# Patient Record
Sex: FEMALE | Race: WHITE
[De-identification: names, ages, dates, MRNs, and addresses within clinical notes are randomized per-mention and may not be internally consistent; named-entity substitution may affect disease eponyms.]

---

## 2017-01-02 ENCOUNTER — HOSPITAL ENCOUNTER (EMERGENCY)
Dept: HOSPITAL 45 - C.EDB | Age: 72
Discharge: HOME | End: 2017-01-02
Payer: COMMERCIAL

## 2017-01-02 VITALS
HEIGHT: 60.98 IN | HEIGHT: 60.98 IN | WEIGHT: 138.23 LBS | BODY MASS INDEX: 26.1 KG/M2 | WEIGHT: 138.23 LBS | BODY MASS INDEX: 26.1 KG/M2

## 2017-01-02 VITALS — DIASTOLIC BLOOD PRESSURE: 90 MMHG | SYSTOLIC BLOOD PRESSURE: 154 MMHG | HEART RATE: 85 BPM | OXYGEN SATURATION: 98 %

## 2017-01-02 VITALS — TEMPERATURE: 98.42 F

## 2017-01-02 DIAGNOSIS — S20.20XA: ICD-10-CM
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
## 2017-01-02 DIAGNOSIS — Z79.82: ICD-10-CM

## 2017-01-02 DIAGNOSIS — J18.9: Primary | ICD-10-CM

## 2017-01-02 DIAGNOSIS — Z79.899: ICD-10-CM

## 2017-01-02 DIAGNOSIS — G20: ICD-10-CM

## 2017-01-02 DIAGNOSIS — I25.2: ICD-10-CM

## 2017-01-02 LAB
ALP SERPL-CCNC: 105 U/L (ref 45–117)
ALT SERPL-CCNC: 10 U/L (ref 12–78)
ANION GAP SERPL CALC-SCNC: 10 MMOL/L (ref 3–11)
AST SERPL-CCNC: 18 U/L (ref 15–37)
BASOPHILS # BLD: 0.01 K/UL (ref 0–0.2)
BASOPHILS NFR BLD: 0.1 %
BUN SERPL-MCNC: 10 MG/DL (ref 7–18)
BUN/CREAT SERPL: 14.2 (ref 10–20)
CALCIUM SERPL-MCNC: 8.9 MG/DL (ref 8.5–10.1)
CHLORIDE SERPL-SCNC: 109 MMOL/L (ref 98–107)
CKMB/CK RATIO: 1.4 (ref 0–3)
CO2 SERPL-SCNC: 25 MMOL/L (ref 21–32)
COMPLETE: YES
CREAT CL PREDICTED SERPL C-G-VRATE: 60.8 ML/MIN
CREAT SERPL-MCNC: 0.73 MG/DL (ref 0.6–1.2)
EOSINOPHIL NFR BLD AUTO: 238 K/UL (ref 130–400)
GLUCOSE SERPL-MCNC: 132 MG/DL (ref 70–99)
HCT VFR BLD CALC: 46.3 % (ref 37–47)
IG%: 0.1 %
IMM GRANULOCYTES NFR BLD AUTO: 13.5 %
LYMPHOCYTES # BLD: 1.28 K/UL (ref 1.2–3.4)
MAGNESIUM SERPL-MCNC: 2.2 MG/DL (ref 1.8–2.4)
MCH RBC QN AUTO: 29.8 PG (ref 25–34)
MCHC RBC AUTO-ENTMCNC: 33 G/DL (ref 32–36)
MCV RBC AUTO: 90.1 FL (ref 80–100)
MONOCYTES NFR BLD: 7.2 %
NEUTROPHILS # BLD AUTO: 1.8 %
NEUTROPHILS NFR BLD AUTO: 77.3 %
PMV BLD AUTO: 11.6 FL (ref 7.4–10.4)
POTASSIUM SERPL-SCNC: 4 MMOL/L (ref 3.5–5.1)
RBC # BLD AUTO: 5.14 M/UL (ref 4.2–5.4)
SODIUM SERPL-SCNC: 144 MMOL/L (ref 136–145)
WBC # BLD AUTO: 9.49 K/UL (ref 4.8–10.8)
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
## 2017-03-26 ENCOUNTER — HOSPITAL ENCOUNTER (EMERGENCY)
Dept: HOSPITAL 45 - C.EDB | Age: 72
Discharge: HOME | End: 2017-03-26
Payer: COMMERCIAL

## 2017-03-26 VITALS — TEMPERATURE: 98.42 F

## 2017-03-26 VITALS — OXYGEN SATURATION: 97 % | DIASTOLIC BLOOD PRESSURE: 71 MMHG | HEART RATE: 61 BPM | SYSTOLIC BLOOD PRESSURE: 124 MMHG

## 2017-03-26 VITALS — HEIGHT: 60.98 IN

## 2017-03-26 DIAGNOSIS — G20: ICD-10-CM

## 2017-03-26 DIAGNOSIS — M25.552: ICD-10-CM

## 2017-03-26 DIAGNOSIS — Z98.61: ICD-10-CM

## 2017-03-26 DIAGNOSIS — G89.29: ICD-10-CM

## 2017-03-26 DIAGNOSIS — I25.10: ICD-10-CM

## 2017-03-26 DIAGNOSIS — Z79.82: ICD-10-CM

## 2017-03-26 DIAGNOSIS — M54.5: Primary | ICD-10-CM

## 2017-03-26 DIAGNOSIS — I25.2: ICD-10-CM

## 2017-03-26 DIAGNOSIS — Z82.49: ICD-10-CM

## 2017-03-26 DIAGNOSIS — M48.00: ICD-10-CM

## 2017-03-26 DIAGNOSIS — Z79.899: ICD-10-CM

## 2017-03-26 DIAGNOSIS — I51.9: ICD-10-CM

## 2017-03-26 DIAGNOSIS — Z84.1: ICD-10-CM

## 2017-03-26 DIAGNOSIS — Z80.9: ICD-10-CM

## 2017-03-26 LAB
ANION GAP SERPL CALC-SCNC: 8 MMOL/L (ref 3–11)
BASOPHILS # BLD: 0.02 K/UL (ref 0–0.2)
BASOPHILS NFR BLD: 0.2 %
BUN SERPL-MCNC: 12 MG/DL (ref 7–18)
BUN/CREAT SERPL: 17.8 (ref 10–20)
CALCIUM SERPL-MCNC: 8.7 MG/DL (ref 8.5–10.1)
CHLORIDE SERPL-SCNC: 106 MMOL/L (ref 98–107)
CO2 SERPL-SCNC: 28 MMOL/L (ref 21–32)
COMPLETE: YES
CREAT SERPL-MCNC: 0.65 MG/DL (ref 0.6–1.2)
CRP SERPL-MCNC: < 0.29 MG/DL (ref 0–0.29)
EOSINOPHIL NFR BLD AUTO: 251 K/UL (ref 130–400)
GLUCOSE SERPL-MCNC: 153 MG/DL (ref 70–99)
HCT VFR BLD CALC: 44 % (ref 37–47)
IG%: 0.4 %
IMM GRANULOCYTES NFR BLD AUTO: 13.7 %
LYMPHOCYTES # BLD: 1.12 K/UL (ref 1.2–3.4)
MCH RBC QN AUTO: 30.2 PG (ref 25–34)
MCHC RBC AUTO-ENTMCNC: 33.4 G/DL (ref 32–36)
MCV RBC AUTO: 90.5 FL (ref 80–100)
MONOCYTES NFR BLD: 8.2 %
NEUTROPHILS # BLD AUTO: 2.9 %
NEUTROPHILS NFR BLD AUTO: 74.6 %
PMV BLD AUTO: 11.5 FL (ref 7.4–10.4)
POTASSIUM SERPL-SCNC: 3.9 MMOL/L (ref 3.5–5.1)
RBC # BLD AUTO: 4.86 M/UL (ref 4.2–5.4)
SODIUM SERPL-SCNC: 142 MMOL/L (ref 136–145)
WBC # BLD AUTO: 8.2 K/UL (ref 4.8–10.8)

## 2017-03-26 NOTE — DIAGNOSTIC IMAGING REPORT
CT OF THE LUMBAR SPINE WITHOUT CONTRAST



CT DOSE: 1127.02 mGy.cm



CLINICAL HISTORY: Low back pain.    



TECHNIQUE: Axial images of the lumbar spine were obtained without IV contrast.

Sagittal and coronal reconstructions were viewed.



COMPARISON STUDY:  CT of the abdomen and pelvis January 1, 2013.



FINDINGS: For purposes of numbering on this exam, the L5-S1 disc space is

assigned to axial image 591 of 757. There is mild rightward curvature of the

lumbar spine. No acute lumbar spine fracture is identified. There is mild

anterolisthesis of L3 on L4 and L4 on L5. There is moderate to marked disc space

narrowing with endplate irregularity and vacuum disc phenomenon at the L4-L5 and

L5-S1 levels. Vacuum disc phenomenon is noted at several additional levels.

There is mild loss of height of several vertebral bodies which is likely

degenerative. These findings have progressed since CT of January 1, 2013. A 1.5

cm sclerotic lesion within the left aspect of the L3 vertebral body is unchanged

since CT of January 1, 2013. Therefore, this is benign. The central canal and

neural foramen are suboptimally assessed given CT technique. However, there is

suspected severe central canal stenosis at L4-L5 and L5-S1 due to a combination

of disc bulge, ligamentous hypertrophy and facet arthrosis. There is at least

moderate central canal stenosis at L3-L4. There is moderate to severe multilevel

neural foraminal stenosis.



IMPRESSION:  



1. No acute lumbar spine fracture or subluxation.



2. Severe multilevel degenerative disc disease and facet arthrosis of the lumbar

spine, most pronounced at L4-L5 and L5-S1. Associated endplate irregularity with

mild loss of height of several vertebral bodies is likely degenerative.



3. Suboptimal evaluation of the central canal and neural foramen given CT

technique however suspected severe central canal stenosis at L4-L5 and L5-S1

with at least moderate central canal stenosis at L3-L4. Severe multilevel neural

foraminal stenosis. 



4. Grade I anterolisthesis of L3 on L4 and L4 and L5.







Electronically signed by:  Cisco Ortiz M.D.

3/26/2017 7:18 PM



Dictated Date/Time:  3/26/2017 7:08 PM

## 2017-03-26 NOTE — DIAGNOSTIC IMAGING REPORT
LEFT HIP UNILATERAL 2 VIEWS



CLINICAL HISTORY: Left hip pain.    



COMPARISON: CT of the abdomen and pelvis January 1, 2013.



FINDINGS:  Alignment of left hip is anatomic. There is no acute fracture or

suspicious lesion. There is no evidence for avascular necrosis. There is mild

joint space narrowing and osteophytosis of the left hip.



IMPRESSION: 



1. No acute fracture or dislocation of the left hip.



2. Mild left hip arthritis.







Electronically signed by:  Cisco Ortiz M.D.

3/26/2017 7:19 PM



Dictated Date/Time:  3/26/2017 7:18 PM

## 2017-03-27 NOTE — EMERGENCY ROOM VISIT NOTE
History


Report prepared by Elaina:  Jason Lee


Under the Supervision of:  Dr. Kodak Hackett M.D.


First contact with patient:  18:03


Chief Complaint:  BACK PAIN


Stated Complaint:  PAIN IN BACK AND LEG (L)





History of Present Illness


The patient is a 72 year old female who presents to the Emergency Room with 

complaints of constant and severe left sided lumbar back pain that began one 

night prior to arrival. The patient's  states that she has had similar 

episodes off and on in the past, but never to this severity. She rates her 

current pain as a 10/10 in severity. It is worsened with movement. Her pain 

begins in her left lower back and radiates down into her left knee. Her  

states that she has not been doing well at home lately, and cannot put any 

weight on her left leg at all. The patient denies any recent falls or injuries 

that could have caused the pain. She denies any numbness or tingling in the 

leg. She has been prescribed 10 mg of Vicodin by her primary care physician, 

but recently has only been given 5 mg. She does have a history of Parkinson's. 

She denies LOC, headache, fevers, chills, diaphoresis, visual changes, chest 

pain, breathing difficulties, nausea, vomiting, abdominal pain, melena, 

hematochezia, urinary symptoms, lymphadenopathy, rash, or other complaints.





   Source of History:  patient, spouse/significant other


   Onset:  One night PTA


   Position:  back (lower, left )


   Symptom Intensity:  10/10 in severity


   Modifying Factors (Worsening):  movement


Note:


Left leg pain in to left knee





Review of Systems


See HPI for pertinent positives and negatives.  A total of ten systems were 

reviewed and were otherwise negative.





Past Medical & Surgical


Medical Problems:


(1) CAD S/P percutaneous coronary angioplasty


(2) CHF (congestive heart failure), NYHA class III


(3) Heart disease


(4) Parkinson disease


(5) Pneumonia


(6) Spinal stenosis


(7) STEMI (ST elevation myocardial infarction)


(8) STEMI (ST elevation myocardial infarction)


(9) Ventricular aneurysm








Family History





Cancer


Hypertension


Kidney disease


Kidney stones





Social History


Smoking Status:  Never Smoker


Alcohol Use:  none


Drug Use:  none


Marital Status:  


Housing Status:  lives with significant other


Occupation Status:  retired





Current/Historical Medications


Scheduled


Amantadine HCl (Amantadine HCl), 100 MG PO QAM


Aspirin (Aspirin Ec), 81 MG PO DAILY


Atorvastatin (Lipitor), 80 MG PO HS


Carbidopa/Levodopa (Sinemet 25MG/100MG), 1 TAB PO Q1H


Metoprolol Succinate (Metoprolol Succinate ER), 12.5 MG PO QAM


Pramipexole Dihydrochloride (Pramipexole Dihydrochlori), 1 MG PO TID


Ranitidine HCl (Ranitidine HCl), 150 MG PO BID





Scheduled PRN


Baclofen (Baclofen), 20 MG PO TID PRN for Pain


Hydrocodone/Acetaminophen 5MG/325MG (Norco 5MG/325MG), 1-2 TABS PO Q6H PRN for 

Pain


Lactulose (Chronulac), 60 ML PO BID PRN for Constipation





Allergies


Coded Allergies:  


     No Known Allergies (Unverified , 3/26/17)





Physical Exam


Vital Signs











  Date Time  Temp Pulse Resp B/P Pulse Ox O2 Delivery O2 Flow Rate FiO2


 


3/26/17 20:13  61 18 124/71 97 Room Air  


 


3/26/17 17:56 36.9 92 16 174/90 96 Room Air  











Physical Exam


GENERAL: Awake, alert, uncomfortable-appearing, in no distress


HENT: Normocephalic, atraumatic. Oropharynx unremarkable.


EYES: Normal conjunctiva. Sclera non-icteric.


NECK: Supple. No nuchal rigidity. FROM. No JVD.


RESPIRATORY: Clear to auscultation.


CARDIAC: Regular rate, normal rhythm. Extremities warm and well perfused. 

Pulses equal.


ABDOMEN: Soft, non-distended. No tenderness to palpation. No rebound or 

guarding. No masses.


RECTAL: Deferred.


MUSCULOSKELETAL: Chest examination reveals no tenderness. The back is 

symmetrical on inspection without obvious abnormality. There is mild left 

lumbar paraspinal tenderness to palpation.  There is no CVA tenderness to 

palpation. No joint edema. 


LOWER EXTREMITIES: Calves are equal size bilaterally and non-tender. No edema. 

There is mild bruising noted diffusely across the left leg.  Negative Homans 

sign.


NEURO: Normal sensorium. No sensory or motor deficits noted. 


SKIN: No rash or jaundice noted.





Medical Decision & Procedures


ER Provider


Diagnostic Interpretation:


X ray results as stated below per my interpretation and radiologist 

interpretation. Other radiology results as stated below per my review and 

radiologist interpretation





LEFT HIP UNILATERAL 2 VIEWS





CLINICAL HISTORY: Left hip pain.    





COMPARISON: CT of the abdomen and pelvis January 1, 2013.





FINDINGS:  Alignment of left hip is anatomic. There is no acute fracture or


suspicious lesion. There is no evidence for avascular necrosis. There is mild


joint space narrowing and osteophytosis of the left hip.





IMPRESSION: 





1. No acute fracture or dislocation of the left hip.





2. Mild left hip arthritis.











Electronically signed by:  Cisco Ortiz M.D.


3/26/2017 7:19 PM





Dictated Date/Time:  3/26/2017 7:18 PM





CT OF THE LUMBAR SPINE WITHOUT CONTRAST





CT DOSE: 1127.02 mGy.cm





CLINICAL HISTORY: Low back pain.    





TECHNIQUE: Axial images of the lumbar spine were obtained without IV contrast.


Sagittal and coronal reconstructions were viewed.





COMPARISON STUDY:  CT of the abdomen and pelvis January 1, 2013.





FINDINGS: For purposes of numbering on this exam, the L5-S1 disc space is


assigned to axial image 591 of 757. There is mild rightward curvature of the


lumbar spine. No acute lumbar spine fracture is identified. There is mild


anterolisthesis of L3 on L4 and L4 on L5. There is moderate to marked disc space


narrowing with endplate irregularity and vacuum disc phenomenon at the L4-L5 and


L5-S1 levels. Vacuum disc phenomenon is noted at several additional levels.


There is mild loss of height of several vertebral bodies which is likely


degenerative. These findings have progressed since CT of January 1, 2013. A 1.5


cm sclerotic lesion within the left aspect of the L3 vertebral body is unchanged


since CT of January 1, 2013. Therefore, this is benign. The central canal and


neural foramen are suboptimally assessed given CT technique. However, there is


suspected severe central canal stenosis at L4-L5 and L5-S1 due to a combination


of disc bulge, ligamentous hypertrophy and facet arthrosis. There is at least


moderate central canal stenosis at L3-L4. There is moderate to severe multilevel


neural foraminal stenosis.





IMPRESSION:  





1. No acute lumbar spine fracture or subluxation.





2. Severe multilevel degenerative disc disease and facet arthrosis of the lumbar


spine, most pronounced at L4-L5 and L5-S1. Associated endplate irregularity with


mild loss of height of several vertebral bodies is likely degenerative.





3. Suboptimal evaluation of the central canal and neural foramen given CT


technique however suspected severe central canal stenosis at L4-L5 and L5-S1


with at least moderate central canal stenosis at L3-L4. Severe multilevel neural


foraminal stenosis. 





4. Grade I anterolisthesis of L3 on L4 and L4 and L5.











Electronically signed by:  Cisco Ortiz M.D.


3/26/2017 7:18 PM





Dictated Date/Time:  3/26/2017 7:08 PM





Laboratory Results


3/26/17 18:30








Red Blood Count 4.86, Mean Corpuscular Volume 90.5, Mean Corpuscular Hemoglobin 

30.2, Mean Corpuscular Hemoglobin Concent 33.4, Mean Platelet Volume 11.5, 

Neutrophils (%) (Auto) 74.6, Lymphocytes (%) (Auto) 13.7, Monocytes (%) (Auto) 

8.2, Eosinophils (%) (Auto) 2.9, Basophils (%) (Auto) 0.2, Neutrophils # (Auto) 

6.12, Lymphocytes # (Auto) 1.12, Monocytes # (Auto) 0.67, Eosinophils # (Auto) 

0.24, Basophils # (Auto) 0.02





3/26/17 18:30

















Test


  3/26/17


18:30


 


White Blood Count


  8.20 K/uL


(4.8-10.8)


 


Red Blood Count


  4.86 M/uL


(4.2-5.4)


 


Hemoglobin


  14.7 g/dL


(12.0-16.0)


 


Hematocrit 44.0 % (37-47) 


 


Mean Corpuscular Volume


  90.5 fL


()


 


Mean Corpuscular Hemoglobin


  30.2 pg


(25-34)


 


Mean Corpuscular Hemoglobin


Concent 33.4 g/dl


(32-36)


 


Platelet Count


  251 K/uL


(130-400)


 


Mean Platelet Volume


  11.5 fL


(7.4-10.4)


 


Neutrophils (%) (Auto) 74.6 % 


 


Lymphocytes (%) (Auto) 13.7 % 


 


Monocytes (%) (Auto) 8.2 % 


 


Eosinophils (%) (Auto) 2.9 % 


 


Basophils (%) (Auto) 0.2 % 


 


Neutrophils # (Auto)


  6.12 K/uL


(1.4-6.5)


 


Lymphocytes # (Auto)


  1.12 K/uL


(1.2-3.4)


 


Monocytes # (Auto)


  0.67 K/uL


(0.11-0.59)


 


Eosinophils # (Auto)


  0.24 K/uL


(0-0.5)


 


Basophils # (Auto)


  0.02 K/uL


(0-0.2)


 


RDW Standard Deviation


  44.9 fL


(36.4-46.3)


 


RDW Coefficient of Variation


  13.6 %


(11.5-14.5)


 


Immature Granulocyte % (Auto) 0.4 % 


 


Immature Granulocyte # (Auto)


  0.03 K/uL


(0.00-0.02)


 


Erythrocyte Sedimentation Rate 5 mm/hr (0-21) 


 


Anion Gap


  8.0 mmol/L


(3-11)


 


Estimated GFR (


American) 103.5 


 


 


Estimated GFR (Non-


American 89.3 


 


 


BUN/Creatinine Ratio 17.8 (10-20) 


 


Calcium Level


  8.7 mg/dl


(8.5-10.1)


 


C-Reactive Protein


  < 0.29 mg/dl


(0-0.29)





Laboratory results reviewed by me





Medications Administered











 Medications


  (Trade)  Dose


 Ordered  Sig/Won


 Route  Start Time


 Stop Time Status Last Admin


Dose Admin


 


 Acetaminophen/


 Hydrocodone Bitart


  (Norco 5/325 Tab)  2 tab  NOW  STAT


 PO  3/26/17 18:14


 3/26/17 18:17 DC 3/26/17 18:29


2 TAB


 


 Acetaminophen/


 Hydrocodone Bitart


  (Norco 5/325mg


 Home Pack)  1 homepack  UD  ONCE


 PO  3/26/17 20:15


 3/26/17 20:16 DC 3/26/17 20:20


1 HOMEPACK











ED Course


1812: The patient was evaluated in room B4. A complete history and physical 

exam was performed.





1814: Ordered Acetaminophen 2 tablets PO. 





1833: I discussed the case with Case Management at this time, they will 

evaluate the patient's living status. 





1915: Case Management has spoken with the patient at this time, they refuse any 

living changes at this time. The patient would like to go home and will see her 

PCP tomorrow. She has in-home services already in place. 





1938: I reevaluated the patient. Discussed results and discharge instructions: 

She verbalized understanding and agreement. The patient is ready for discharge.





Medical Decision


Prior records/ancillary studies reviewed.





Triage Nursing notes reviewed and agree them.





Additional history obtained from the family.





The patient's history was concerning for back pain and left hip pain  





Differential diagnosis:


Etiologies such as herniated disc, spinal stenosis, arthritis, fracture, aortic 

disease, metastatic disease, cord compression, discitis, infection, renal colic

, gastrointestinal, lumbago, sciatica, cauda equina, as well as others were 

entertained.  





Physical findings:


As above.  The patient had no saddle anesthesia.  She had lumbar tenderness.  

Hip range of motion was good.





ER treatment provided:


Oral Norco 2


On reassessment the patient felt better.





Diagnostics interpreted by me:


The labs revealed an unremarkable CBC and chemistry panel.  Inflammatory 

markers negative.  Urinalysis ordered but the patient did not provide one.





Imaging studies:


CT as above





The patient was evaluated.  I did have  meet with the patient.  She 

does not want to be admitted to the hospital or into [facility].  She has home 

physical therapy.  She has pretty severe spinal stenosis and arthritis in the 

hip.  She will be given a small amount of Norco to use which she has had in the 

past. PDMP review did not reveal any significant problems.  The patient will 

follow-up with her primary office as well as with orthopedic spine.  If she 

worsens in any way she will be back.


The patient's physical examination and detailed history did not reveal any red 

flags for back pain such as those listed in the differential diagnosis.  

Therefore advanced diagnostics and consultations were felt to be unwarranted. 





By the evaluation outlined above emergent etiologies such as fracture, aortic 

disease, metastatic disease, infection, renal colic, gastrointestinal, cord 

compression, cauda equina, vascular issues, as well as others were deemed 

relatively unlikely.  





The patient and  were informed about the findings as listed above.  All 

questions were answered and they were pleased with the treatment.  Return 

instructions were outlined and the patient was discharged in stable condition.  





Outpatient prescription management:


Norco








The chart was completed utilizing Dragon Speech voice recognition software.   

Grammatical errors, random word insertions, pronoun errors, and incomplete 

sentences are an occasional consequence of this system due to software 

limitations, ambient noise, and hardware issues.  Any formal questions or 

concerns about the content, text, or information contained within the body of 

this dictation should be directly addressed to the physician for clarification.





Impression





 Primary Impression:  


 Acute exacerbation of chronic low back pain


 Additional Impressions:  


 Spinal stenosis


 Left hip pain





Scribe Attestation


The scribe's documentation has been prepared under my direction and personally 

reviewed by me in its entirety. I confirm that the note above accurately 

reflects all work, treatment, procedures, and medical decision making performed 

by me.





Departure Information


Dispostion


Home / Self-Care





Prescriptions





Hydrocodone/Acetaminophen 5MG/325MG (Norco 5MG/325MG)  Tab


1-2 TABS PO Q6H Y for Pain, #15 TAB


   Prov: Kodak Hackett MD         3/26/17





Referrals


Bill Rubin M.D. (PCP)





Forms


HOME CARE DOCUMENTATION FORM,                                                 

               IMPORTANT VISIT INFORMATION





Patient Instructions


[facility]





Additional Instructions





DO NOT drive, drink alcohol, operate machinery, or perform dangerous activities 

today.  You were given medications in the ER that can affect your ability to 

safely function or operate a vehicle.





Hydrocodone/acetaminophen 5/325mg: Take 1-2 pills every 6 hours as needed for 

pain.  Avoid additional Acetaminophen/Tylenol, alcohol, operating machinery or 

dangerous equipment, working on ladders or roofs, DRIVING, or situations where 

being under the influence may be dangerous.  It is recommended to use a stool 

softener such as Colace, 100mg twice daily while taking this medication to 

avoid constipation.





Rest and avoid heavy lifting until your symptoms resolve and then gradually 

return to full activity.  A good rule of thumb is if it hurts your back to 

perform a certain activity, then it should be avoided until you are healthy 

again.  





A heating pad, warm compresses, or a hot shower may help with tight muscles and 

can be done several times a day as needed.  





Continue current medications.





Return to the ER immediately for any numbness, tingling, severe pain, loss of 

control of your bowels or bladder, inability to walk, or as needed.





Follow up with your primary care physician tomorrow for a recheck of your 

current condition and to discuss adjusting home physical therapy.  





Follow-up with her [facility] appointment tomorrow as scheduled.





Follow-up with Dr. Mead of spine surgery.  The number is listed below.





Problem Qualifiers

## 2017-04-13 ENCOUNTER — HOSPITAL ENCOUNTER (OUTPATIENT)
Dept: HOSPITAL 45 - C.RAD | Age: 72
Discharge: HOME | End: 2017-04-13
Attending: FAMILY MEDICINE
Payer: COMMERCIAL

## 2017-04-13 DIAGNOSIS — M25.569: Primary | ICD-10-CM

## 2017-04-13 NOTE — DIAGNOSTIC IMAGING REPORT
LEFT KNEE 2 VIEWS



CLINICAL HISTORY: Left knee pain.



FINDINGS: AP and crosstable lateral views of left knee are obtained. No prior

studies are available for comparison at the time of dictation. The skeletal

structures are osteopenic. No fracture is seen. There is mild degenerative

narrowing at the patellofemoral articulation. A calcified fabella is

incidentally noted. Soft tissue calcifications are noted in the upper calf. No

large joint effusion is identified. Venous varicosities are present on the

lateral aspect of the knee.



IMPRESSION:



1. No acute bony abnormality is identified in the left knee.



2. Osteopenia and minimal degenerative change as above.



3. Superficial venous varicosities are noted lateral to the knee.







Electronically signed by:  Jovon Bach M.D.

4/13/2017 2:09 PM



Dictated Date/Time:  4/13/2017 2:08 PM

## 2017-05-13 ENCOUNTER — HOSPITAL ENCOUNTER (EMERGENCY)
Dept: HOSPITAL 45 - C.EDB | Age: 72
Discharge: HOME | End: 2017-05-13
Payer: COMMERCIAL

## 2017-05-13 VITALS — HEART RATE: 88 BPM | SYSTOLIC BLOOD PRESSURE: 157 MMHG | DIASTOLIC BLOOD PRESSURE: 85 MMHG | OXYGEN SATURATION: 96 %

## 2017-05-13 VITALS — TEMPERATURE: 98.42 F

## 2017-05-13 VITALS
WEIGHT: 136.69 LBS | BODY MASS INDEX: 24.84 KG/M2 | WEIGHT: 136.69 LBS | HEIGHT: 62.01 IN | HEIGHT: 62.01 IN | BODY MASS INDEX: 24.84 KG/M2

## 2017-05-13 DIAGNOSIS — R51: ICD-10-CM

## 2017-05-13 DIAGNOSIS — I25.2: ICD-10-CM

## 2017-05-13 DIAGNOSIS — Z84.1: ICD-10-CM

## 2017-05-13 DIAGNOSIS — Z87.01: ICD-10-CM

## 2017-05-13 DIAGNOSIS — I25.10: ICD-10-CM

## 2017-05-13 DIAGNOSIS — G20: ICD-10-CM

## 2017-05-13 DIAGNOSIS — Z82.49: ICD-10-CM

## 2017-05-13 DIAGNOSIS — I50.9: ICD-10-CM

## 2017-05-13 DIAGNOSIS — Z79.82: ICD-10-CM

## 2017-05-13 DIAGNOSIS — Z95.5: ICD-10-CM

## 2017-05-13 DIAGNOSIS — M54.2: Primary | ICD-10-CM

## 2017-05-13 DIAGNOSIS — Z79.899: ICD-10-CM

## 2017-05-13 LAB
ALP SERPL-CCNC: 100 U/L (ref 45–117)
ALT SERPL-CCNC: 8 U/L (ref 12–78)
ANION GAP SERPL CALC-SCNC: 7 MMOL/L (ref 3–11)
AST SERPL-CCNC: 13 U/L (ref 15–37)
BASOPHILS # BLD: 0.01 K/UL (ref 0–0.2)
BASOPHILS NFR BLD: 0.1 %
BUN SERPL-MCNC: 14 MG/DL (ref 7–18)
BUN/CREAT SERPL: 24.3 (ref 10–20)
CALCIUM SERPL-MCNC: 8.8 MG/DL (ref 8.5–10.1)
CHLORIDE SERPL-SCNC: 109 MMOL/L (ref 98–107)
CO2 SERPL-SCNC: 28 MMOL/L (ref 21–32)
COMPLETE: YES
CREAT CL PREDICTED SERPL C-G-VRATE: 75.8 ML/MIN
CREAT SERPL-MCNC: 0.59 MG/DL (ref 0.6–1.2)
EOSINOPHIL NFR BLD AUTO: 231 K/UL (ref 130–400)
GLUCOSE SERPL-MCNC: 84 MG/DL (ref 70–99)
HCT VFR BLD CALC: 42.2 % (ref 37–47)
IG%: 0.2 %
IMM GRANULOCYTES NFR BLD AUTO: 19.6 %
LYMPHOCYTES # BLD: 1.64 K/UL (ref 1.2–3.4)
MCH RBC QN AUTO: 29.3 PG (ref 25–34)
MCHC RBC AUTO-ENTMCNC: 31.8 G/DL (ref 32–36)
MCV RBC AUTO: 92.3 FL (ref 80–100)
MONOCYTES NFR BLD: 8.8 %
NEUTROPHILS # BLD AUTO: 2.5 %
NEUTROPHILS NFR BLD AUTO: 68.8 %
PMV BLD AUTO: 10.7 FL (ref 7.4–10.4)
POTASSIUM SERPL-SCNC: 4.3 MMOL/L (ref 3.5–5.1)
RBC # BLD AUTO: 4.57 M/UL (ref 4.2–5.4)
SODIUM SERPL-SCNC: 144 MMOL/L (ref 136–145)
WBC # BLD AUTO: 8.38 K/UL (ref 4.8–10.8)

## 2017-05-13 NOTE — EMERGENCY ROOM VISIT NOTE
History


Report prepared by Elaina:  Kevin Alcantar


Under the Supervision of:  Dr. Efren Ponce M.D.


First contact with patient:  20:21


Chief Complaint:  NECK PAIN


Stated Complaint:  PAIN IN NECK AND DOWN SPINE





History of Present Illness


The patient is a 72 year old female who presents to the Emergency Room with 

complaints of persistent neck pain for the past month. The patient notes that 

the discomfort radiates up to her head, down her spine, and behind her right 

ear. The patient presented to the ED a month ago for similar symptoms. Per 

patient's family, the discomfort has been coming and going for the past month. 

The patient has not seen a spine specialist yet. She has been taking Vicodin 

without any relief of her symptoms.





   Source of History:  patient, family


   Onset:  the past month


   Position:  neck


   Timing:  other (persistent)


   Associated Symptoms:  + back pain, + headache


Note:


Other associated symptoms: radiation to behind the ear





Review of Systems


See HPI for pertinent positives & negatives. A total of 10 systems reviewed and 

were otherwise negative.





Past Medical & Surgical


Medical Problems:


(1) CAD S/P percutaneous coronary angioplasty


(2) CHF (congestive heart failure), NYHA class III


(3) Heart disease


(4) Parkinson disease


(5) Pneumonia


(6) Spinal stenosis


(7) STEMI (ST elevation myocardial infarction)


(8) STEMI (ST elevation myocardial infarction)


(9) Ventricular aneurysm








Family History





Cancer


Hypertension


Kidney disease


Kidney stones





Social History


Smoking Status:  Never Smoker


Alcohol Use:  none


Drug Use:  none


Marital Status:  


Housing Status:  lives with significant other


Occupation Status:  retired





Current/Historical Medications


Scheduled


Amantadine HCl (Amantadine HCl), 100 MG PO QAM


Aspirin (Aspirin Ec), 81 MG PO DAILY


Atorvastatin (Lipitor), 80 MG PO HS


Carbidopa/Levodopa (Sinemet 25MG/100MG), 1 TAB PO Q1H


Gabapentin (Gabapentin), 300 MG PO HS


Metoprolol Succinate (Metoprolol Succinate ER), 12.5 MG PO QAM


Pramipexole Dihydrochloride (Pramipexole Dihydrochlori), 1 MG PO TID


Ranitidine HCl (Ranitidine HCl), 150 MG PO BID





Scheduled PRN


Baclofen (Baclofen), 20 MG PO TID PRN for Pain


Hydrocodone/Acetaminophen 5MG/325MG (Norco 5MG/325MG), 1-2 TABS PO Q6H PRN for 

Pain


Lactulose (Chronulac), 60 ML PO BID PRN for Constipation


Oxycodone/Acetaminophen 5MG/325MG (Percocet 5MG/325MG), 1-2 TAB PO Q4H PRN for 

Pain





Allergies


Coded Allergies:  


     No Known Allergies (Unverified , 3/26/17)





Physical Exam


Vital Signs











  Date Time  Temp Pulse Resp B/P Pulse Ox O2 Delivery O2 Flow Rate FiO2


 


5/13/17 21:42  88      


 


5/13/17 21:37  90 20 168/92 96 Room Air  


 


5/13/17 20:49  85 20 143/84 96   


 


5/13/17 20:14 36.9 68  154/30 96 Room Air  











Physical Exam


GENERAL: Patient is a healthy-appearing well-nourished


HEAD: Normocephalic atraumatic


EYES: Ocular movements intact pupils equal and react to light


OROPHARYNX mucous membranes are moist no exudates present no erythema or edema 

present


NECK: Supple no nuchal rigidity


CHEST: Good equal expansion


LUNGS: Clear and equal to auscultation


CARDIAC: Normal S1 and S2


ABDOMEN: Soft nontender no guarding


BACK: No CVA tenderness


EXTREMITIES: No pain upon palpation normal muscle strength in all groups no 

clubbing cyanosis or edema


NEURO: Patient is following commands is answering questions appropriately. 

Alert and oriented x3 Cranial Nerves 2-12 grossly intact





Medical Decision & Procedures


ER Provider


Diagnostic Interpretation:


CT results as stated below per my review and radiologist interpretation: 





CT SCAN OF THE BRAIN WITHOUT IV CONTRAST





CLINICAL HISTORY: Left-sided headache.





COMPARISON STUDY:  No priors.





TECHNIQUE: Unenhanced axial CT scan of the brain is performed from the vertex to


the skull base. The examination is degraded by streak artifact from intracranial


electrodes.





CT DOSE: Reported separately and the concurrently performed CT scan of the


cervical spine.





FINDINGS:





Brain parenchyma: Electrodes from a bifrontal approach terminate in the basal


ganglia bilaterally. There are age-related involutional changes noting  mild to


moderate patchy subcortical and periventricular microangiopathic change. There


is no hemorrhage, mass effect, or evidence of acute territorial ischemia by CT


criteria. Gray-white matter is preserved. No extra-axial fluid collection is


seen.





Ventricles, sulci, cisterns: Prominent secondary to involutional change.





Intracranial vasculature: There is atherosclerotic calcification of the


cavernous carotid and vertebral arteries.





Calvarium: The skeletal structures are osteopenic. There are bifrontal krzysztof


holes. No destructive calvarial lesion is seen.





Sinuses and mastoids: The visualized paranasal sinuses are clear. The mastoid


air cells are well pneumatized.





Orbits: The bony orbits are grossly intact.








IMPRESSION: There is no hemorrhage, mass effect, or evidence of acute


territorial ischemia by CT criteria.











Electronically signed by:  Jovon Bach M.D.


5/13/2017 9:11 PM





Dictated Date/Time:  5/13/2017 9:09 PM








CT SCAN OF THE CERVICAL SPINE





CLINICAL HISTORY: Left neck pain.





COMPARISON STUDY:  CT angiogram of the neck dated 3/26/2015.





TECHNIQUE: CT scan of the cervical spine is performed from the skull base to the


upper thoracic spine. Images are reviewed in the axial, sagittal, and coronal


planes. IV contrast was not administered for this examination.





CT DOSE: 1025.24 mGy.cm





FINDINGS:





Skeletal structures: The skeletal structures are osteopenic. There is no


evidence of fracture or subluxation involving the cervical spine. Vertebral body


height is maintained.  There is 3 mm of anterolisthesis at C3-C4. Mild


anterolisthesis is also seen at C4-C5. There is likely bony fusion of C5-C6.


There is straightening of the cervical lordosis with reversal centered at C5.


The odontoid process and lateral masses are intact. The atlantoaxial


articulation is preserved noting mild productive degenerative change. The


spinous processes appear intact. Anterior osteophytes are seen from C4 to C7.


Degenerative sclerosis is noted at C5-C6 and C6-C7. There is moderate to


advanced multilevel cervical spondylosis. Uncovertebral and facet arthropathy


contribute sterile foraminal narrowing at most levels.





Intervertebral discs: There is advanced degenerative disc space narrowing at


C5-C6 and C6-C7. Moderate narrowing is seen at C3-C4 and C4-C5.





Central canal: Posterior disc osteophyte complexes at C4-C5, C5-C6, and C6-C7


likely degenerative acquired compromise of the central canal.





Soft tissues: The prevertebral and paraspinous soft tissues are within normal


limits. Electrodes are present within the soft tissues of left neck.





Calvarium: The visualized calvarium at the skull base appears intact.





Brain parenchyma: Partially visualized brain parenchyma the skull base is within


normal limits.





Sinuses and mastoids: The visualized paranasal sinuses are clear. The mastoid


air cells are well pneumatized.





Lung apices: Clear as visualized.








IMPRESSION:





1. There is no evidence of fracture or subluxation involving the cervical spine.





2. Osteopenia and spondylotic change as above.











Electronically signed by:  Jovon Bach M.D.


5/13/2017 9:15 PM





Dictated Date/Time:  5/13/2017 9:09 PM





Laboratory Results


5/13/17 20:40








Red Blood Count 4.57, Mean Corpuscular Volume 92.3, Mean Corpuscular Hemoglobin 

29.3, Mean Corpuscular Hemoglobin Concent 31.8, Mean Platelet Volume 10.7, 

Neutrophils (%) (Auto) 68.8, Lymphocytes (%) (Auto) 19.6, Monocytes (%) (Auto) 

8.8, Eosinophils (%) (Auto) 2.5, Basophils (%) (Auto) 0.1, Neutrophils # (Auto) 

5.76, Lymphocytes # (Auto) 1.64, Monocytes # (Auto) 0.74, Eosinophils # (Auto) 

0.21, Basophils # (Auto) 0.01





5/13/17 20:40

















Test


  5/13/17


20:40


 


White Blood Count


  8.38 K/uL


(4.8-10.8)


 


Red Blood Count


  4.57 M/uL


(4.2-5.4)


 


Hemoglobin


  13.4 g/dL


(12.0-16.0)


 


Hematocrit 42.2 % (37-47) 


 


Mean Corpuscular Volume


  92.3 fL


()


 


Mean Corpuscular Hemoglobin


  29.3 pg


(25-34)


 


Mean Corpuscular Hemoglobin


Concent 31.8 g/dl


(32-36)


 


Platelet Count


  231 K/uL


(130-400)


 


Mean Platelet Volume


  10.7 fL


(7.4-10.4)


 


Neutrophils (%) (Auto) 68.8 % 


 


Lymphocytes (%) (Auto) 19.6 % 


 


Monocytes (%) (Auto) 8.8 % 


 


Eosinophils (%) (Auto) 2.5 % 


 


Basophils (%) (Auto) 0.1 % 


 


Neutrophils # (Auto)


  5.76 K/uL


(1.4-6.5)


 


Lymphocytes # (Auto)


  1.64 K/uL


(1.2-3.4)


 


Monocytes # (Auto)


  0.74 K/uL


(0.11-0.59)


 


Eosinophils # (Auto)


  0.21 K/uL


(0-0.5)


 


Basophils # (Auto)


  0.01 K/uL


(0-0.2)


 


RDW Standard Deviation


  47.8 fL


(36.4-46.3)


 


RDW Coefficient of Variation


  14.1 %


(11.5-14.5)


 


Immature Granulocyte % (Auto) 0.2 % 


 


Immature Granulocyte # (Auto)


  0.02 K/uL


(0.00-0.02)


 


Anion Gap


  7.0 mmol/L


(3-11)


 


Est Creatinine Clear Calc


Drug Dose 75.8 ml/min 


 


 


Estimated GFR (


American) 106.9 


 


 


Estimated GFR (Non-


American 92.2 


 


 


BUN/Creatinine Ratio 24.3 (10-20) 


 


Calcium Level


  8.8 mg/dl


(8.5-10.1)


 


Total Bilirubin


  1.0 mg/dl


(0.2-1)


 


Direct Bilirubin


  0.2 mg/dl


(0-0.2)


 


Aspartate Amino Transf


(AST/SGOT) 13 U/L (15-37) 


 


 


Alanine Aminotransferase


(ALT/SGPT) 8 U/L (12-78) 


 


 


Alkaline Phosphatase


  100 U/L


()


 


Total Protein


  6.2 gm/dl


(6.4-8.2)


 


Albumin


  3.6 gm/dl


(3.4-5.0)


 


Lipase


  95 U/L


()





Labs reviewed by ED physician.





Medications Administered











 Medications


  (Trade)  Dose


 Ordered  Sig/Won


 Route  Start Time


 Stop Time Status Last Admin


Dose Admin


 


 Hydromorphone HCl


  (Dilaudid Inj)  1 mg  NOW  STAT


 IV  5/13/17 20:29


 5/13/17 20:31 DC 5/13/17 20:44


1 MG


 


 Ondansetron HCl


  (Zofran Inj)  4 mg  NOW  STAT


 IV  5/13/17 20:29


 5/13/17 20:31 DC 5/13/17 20:43


4 MG


 


 Oxycodone/


 Acetaminophen


  (Percocet 5/


 325MG Home Pack)  1 homepack  UD  ONCE


 PO  5/13/17 22:15


 5/13/17 22:16 DC 5/13/17 22:25


1 HOMEPACK











ED Course


2022: Past medical records reviewed. The patient was evaluated in room B8. A 

complete history and physical examination was performed. 





2029: Ordered Zofran Inj 4 mg IV, Dilaudid Inj 1 mg IV. 





2215: Ordered Oxycodone/ Acetaminophen 1 homepack PO. 





2227: Upon reexamination the patient is resting comfortably. I discussed 

results and treatment plan with the patient. He verbalizes agreement and 

understanding. The patient is ready for discharge.





Medical Decision


This is a 72-year-old female who presents emergency department complaining of 

neck and head pain.  The patient has Parkinson's and I believe this is 

contributing to her muscle skeletal pain.  She was recently seen in the 

emergency department for similar symptoms to her lower back.  She has been 

taking Norco at home without relief.  An IV was established here in emergency 

department the patient was given Dilaudid.  Repeat examination revealed 

improvement patient's symptoms.  I'm going up the patient's pain medication of 

Percocet recommended she take 1-2 every 4-6 hours.  They already have a follow-

up appointment with rheumatology on Tuesday however I recommended follow-up 

with spine the patient is continuing to have pain.  Patient family were in 

agreement with the treatment plan.





Impression





 Primary Impression:  


 Neck pain





Scribe Attestation


The scribe's documentation has been prepared under my direction and personally 

reviewed by me in its entirety. I confirm that the note above accurately 

reflects all work, treatment, procedures, and medical decision making performed 

by me.





Departure Information


Dispostion


Home / Self-Care





Prescriptions





Oxycodone/Acetaminophen 5MG/325MG (PERCOCET 5MG/325MG)  Tab


1-2 TAB PO Q4H Y for Pain, #14 TAB


   Prov: Efren Ponce MD         5/13/17





Referrals


Bill Rubin M.D. (PCP)





Forms


HOME CARE DOCUMENTATION FORM,                                                 

               IMPORTANT VISIT INFORMATION, WORK / SCHOOL INSTRUCTIONS





Patient Instructions


ED Neck Pain No Trauma, Exercises Neck Isometrics, Exercises Neck Passive 

Rotation, My [facility] [employer]





Additional Instructions





STOP taking Vicodin


Take 1-2 Percocet every 4-6 hours as needed for pain


Follow up with DR Copeland's office








You have been examined and treated today on an emergency basis only. This is 

not a substitute for, or an effort to provide, complete comprehensive medical 

care. It is impossible to recognize and treat all injuries or illnesses in a 

single emergency department visit. It is therefore important that you follow up 

closely with Dr Bondalapati.  Call as soon as possible for an appointment.  





Thank you for your time and consideration.  I look forward to speaking with you 

again soon.  Please don't hesitate to call us if you have any questions.

## 2017-05-13 NOTE — DIAGNOSTIC IMAGING REPORT
CT SCAN OF THE CERVICAL SPINE



CLINICAL HISTORY: Left neck pain.



COMPARISON STUDY:  CT angiogram of the neck dated 3/26/2015.



TECHNIQUE: CT scan of the cervical spine is performed from the skull base to the

upper thoracic spine. Images are reviewed in the axial, sagittal, and coronal

planes. IV contrast was not administered for this examination.



CT DOSE: 1025.24 mGy.cm



FINDINGS:



Skeletal structures: The skeletal structures are osteopenic. There is no

evidence of fracture or subluxation involving the cervical spine. Vertebral body

height is maintained.  There is 3 mm of anterolisthesis at C3-C4. Mild

anterolisthesis is also seen at C4-C5. There is likely bony fusion of C5-C6.

There is straightening of the cervical lordosis with reversal centered at C5.

The odontoid process and lateral masses are intact. The atlantoaxial

articulation is preserved noting mild productive degenerative change. The

spinous processes appear intact. Anterior osteophytes are seen from C4 to C7.

Degenerative sclerosis is noted at C5-C6 and C6-C7. There is moderate to

advanced multilevel cervical spondylosis. Uncovertebral and facet arthropathy

contribute sterile foraminal narrowing at most levels.



Intervertebral discs: There is advanced degenerative disc space narrowing at

C5-C6 and C6-C7. Moderate narrowing is seen at C3-C4 and C4-C5.



Central canal: Posterior disc osteophyte complexes at C4-C5, C5-C6, and C6-C7

likely degenerative acquired compromise of the central canal.



Soft tissues: The prevertebral and paraspinous soft tissues are within normal

limits. Electrodes are present within the soft tissues of left neck.



Calvarium: The visualized calvarium at the skull base appears intact.



Brain parenchyma: Partially visualized brain parenchyma the skull base is within

normal limits.



Sinuses and mastoids: The visualized paranasal sinuses are clear. The mastoid

air cells are well pneumatized.



Lung apices: Clear as visualized.





IMPRESSION:



1. There is no evidence of fracture or subluxation involving the cervical spine.



2. Osteopenia and spondylotic change as above.







Electronically signed by:  Jovon Bach M.D.

5/13/2017 9:15 PM



Dictated Date/Time:  5/13/2017 9:09 PM

## 2017-05-13 NOTE — DIAGNOSTIC IMAGING REPORT
CT SCAN OF THE BRAIN WITHOUT IV CONTRAST



CLINICAL HISTORY: Left-sided headache.



COMPARISON STUDY:  No priors.



TECHNIQUE: Unenhanced axial CT scan of the brain is performed from the vertex to

the skull base. The examination is degraded by streak artifact from intracranial

electrodes.



CT DOSE: Reported separately and the concurrently performed CT scan of the

cervical spine.



FINDINGS:



Brain parenchyma: Electrodes from a bifrontal approach terminate in the basal

ganglia bilaterally. There are age-related involutional changes noting  mild to

moderate patchy subcortical and periventricular microangiopathic change. There

is no hemorrhage, mass effect, or evidence of acute territorial ischemia by CT

criteria. Gray-white matter is preserved. No extra-axial fluid collection is

seen.



Ventricles, sulci, cisterns: Prominent secondary to involutional change.



Intracranial vasculature: There is atherosclerotic calcification of the

cavernous carotid and vertebral arteries.



Calvarium: The skeletal structures are osteopenic. There are bifrontal krzysztof

holes. No destructive calvarial lesion is seen.



Sinuses and mastoids: The visualized paranasal sinuses are clear. The mastoid

air cells are well pneumatized.



Orbits: The bony orbits are grossly intact.





IMPRESSION: There is no hemorrhage, mass effect, or evidence of acute

territorial ischemia by CT criteria.







Electronically signed by:  Jovon Bach M.D.

5/13/2017 9:11 PM



Dictated Date/Time:  5/13/2017 9:09 PM

## 2017-07-19 ENCOUNTER — HOSPITAL ENCOUNTER (EMERGENCY)
Dept: HOSPITAL 45 - C.EDB | Age: 72
Discharge: HOME | End: 2017-07-19
Payer: COMMERCIAL

## 2017-07-19 VITALS
DIASTOLIC BLOOD PRESSURE: 64 MMHG | SYSTOLIC BLOOD PRESSURE: 100 MMHG | OXYGEN SATURATION: 96 % | HEART RATE: 84 BPM | TEMPERATURE: 98.42 F

## 2017-07-19 VITALS
BODY MASS INDEX: 24.14 KG/M2 | WEIGHT: 127.87 LBS | HEIGHT: 60.98 IN | WEIGHT: 127.87 LBS | BODY MASS INDEX: 24.14 KG/M2 | HEIGHT: 60.98 IN

## 2017-07-19 DIAGNOSIS — Z84.1: ICD-10-CM

## 2017-07-19 DIAGNOSIS — Z87.01: ICD-10-CM

## 2017-07-19 DIAGNOSIS — I25.2: ICD-10-CM

## 2017-07-19 DIAGNOSIS — G20: ICD-10-CM

## 2017-07-19 DIAGNOSIS — Z98.62: ICD-10-CM

## 2017-07-19 DIAGNOSIS — M62.838: ICD-10-CM

## 2017-07-19 DIAGNOSIS — Z79.82: ICD-10-CM

## 2017-07-19 DIAGNOSIS — M54.12: Primary | ICD-10-CM

## 2017-07-19 DIAGNOSIS — Z80.9: ICD-10-CM

## 2017-07-19 DIAGNOSIS — I50.9: ICD-10-CM

## 2017-07-19 DIAGNOSIS — Z79.899: ICD-10-CM

## 2017-07-19 DIAGNOSIS — Z82.49: ICD-10-CM

## 2017-07-19 LAB
ALP SERPL-CCNC: 89 U/L (ref 45–117)
ALT SERPL-CCNC: 9 U/L (ref 12–78)
ANION GAP SERPL CALC-SCNC: 6 MMOL/L (ref 3–11)
AST SERPL-CCNC: 10 U/L (ref 15–37)
BASOPHILS # BLD: 0.01 K/UL (ref 0–0.2)
BASOPHILS NFR BLD: 0.1 %
BUN SERPL-MCNC: 11 MG/DL (ref 7–18)
BUN/CREAT SERPL: 17.9 (ref 10–20)
CALCIUM SERPL-MCNC: 9.1 MG/DL (ref 8.5–10.1)
CHLORIDE SERPL-SCNC: 113 MMOL/L (ref 98–107)
CO2 SERPL-SCNC: 27 MMOL/L (ref 21–32)
COMPLETE: YES
CREAT CL PREDICTED SERPL C-G-VRATE: 68.1 ML/MIN
CREAT SERPL-MCNC: 0.62 MG/DL (ref 0.6–1.2)
EOSINOPHIL NFR BLD AUTO: 219 K/UL (ref 130–400)
GLUCOSE SERPL-MCNC: 115 MG/DL (ref 70–99)
HCT VFR BLD CALC: 43 % (ref 37–47)
IG%: 0.1 %
IMM GRANULOCYTES NFR BLD AUTO: 14.3 %
LYMPHOCYTES # BLD: 1.05 K/UL (ref 1.2–3.4)
MAGNESIUM SERPL-MCNC: 2.2 MG/DL (ref 1.8–2.4)
MCH RBC QN AUTO: 29.5 PG (ref 25–34)
MCHC RBC AUTO-ENTMCNC: 32.3 G/DL (ref 32–36)
MCV RBC AUTO: 91.3 FL (ref 80–100)
MONOCYTES NFR BLD: 8.1 %
NEUTROPHILS # BLD AUTO: 2 %
NEUTROPHILS NFR BLD AUTO: 75.4 %
PMV BLD AUTO: 11.3 FL (ref 7.4–10.4)
POTASSIUM SERPL-SCNC: 3.7 MMOL/L (ref 3.5–5.1)
RBC # BLD AUTO: 4.71 M/UL (ref 4.2–5.4)
SODIUM SERPL-SCNC: 146 MMOL/L (ref 136–145)
WBC # BLD AUTO: 7.32 K/UL (ref 4.8–10.8)

## 2017-07-19 NOTE — EMERGENCY ROOM VISIT NOTE
History


Report prepared by Elaina:  Erna Simons


Under the Supervision of:  Dr. Debbie Duque M.D.


First contact with patient:  15:58


Chief Complaint:  BACK PAIN


Stated Complaint:  SHARP PAIN IN BACK AND HEAD





History of Present Illness


The patient is a 72 year old female who presents to the Emergency Room with 

complaints of persistent back pain that began several days ago.  She currently 

rates her discomfort as a 10/10 in severity.  The patient states that she has 

been dealing with back pain intermittently.  She states that she has an 

appointment with Dr. Garay tomorrow, but states that her pain became too severe 

today that she had to have further evaluation today.  The patient reports back 

pain and neck pain.  She denies any recent fall or injury.  The patient denies 

any fever or urinary symptoms.  She reports recent weight loss.  The patient's 

 notes that the patient took Norco and Gabapentin for her pain.  The 

patient denies having any Oxycodone or Baclofen at home.  She states that 

hydrocodone has worked best for her pain.





   Source of History:  patient, spouse/significant other ()


   Onset:  several days ago


   Position:  back


   Symptom Intensity:  10/10


   Timing:  other (persistent)


   Associated Symptoms:  + neck pain, No fevers, No urinary symptoms





Review of Systems


See HPI for pertinent positives & negatives. A total of 10 systems reviewed and 

were otherwise negative.





Past Medical & Surgical


Medical Problems:


(1) CAD S/P percutaneous coronary angioplasty


(2) CHF (congestive heart failure), NYHA class III


(3) Heart disease


(4) Parkinson disease


(5) Pneumonia


(6) Spinal stenosis


(7) STEMI (ST elevation myocardial infarction)


(8) STEMI (ST elevation myocardial infarction)


(9) Ventricular aneurysm








Family History





Cancer


Hypertension


Kidney disease


Kidney stones





Social History


Smoking Status:  Never Smoker


Alcohol Use:  none


Drug Use:  none


Marital Status:  


Housing Status:  lives with significant other


Occupation Status:  retired





Current/Historical Medications


Scheduled


Amantadine HCl (Amantadine HCl), 100 MG PO QAM


Aspirin (Aspirin Ec), 81 MG PO DAILY


Atorvastatin (Lipitor), 80 MG PO HS


Carbidopa/Levodopa (Sinemet 25MG/100MG), 1 TAB PO Q1H


Gabapentin (Gabapentin), 100 MG PO TID


Metoprolol Succinate (Metoprolol Succinate ER), 12.5 MG PO QAM


Pramipexole Dihydrochloride (Pramipexole Dihydrochlori), 1 MG PO TID


Prednisone (Prednisone), 2 TAB PO DAILY


Ranitidine HCl (Ranitidine HCl), 150 MG PO BID





Scheduled PRN


Baclofen (Baclofen), 20 MG PO TID PRN for Pain


Hydrocodone/Acetaminophen 5MG/325MG (Norco 5MG/325MG), 1 TABLET PO Q6H PRN for 

Pain


Lactulose (Chronulac), 60 ML PO BID PRN for Constipation





Allergies


Coded Allergies:  


     No Known Allergies (Unverified , 7/20/17)





Physical Exam


Vital Signs











  Date Time  Temp Pulse Resp B/P (MAP) Pulse Ox O2 Delivery O2 Flow Rate FiO2


 


7/19/17 18:46 36.9 84 16 100/64 96 Room Air  


 


7/19/17 17:43  84 16 100/64 96   


 


7/19/17 15:52 36.9 104 18 106/67 96 Room Air  











Physical Exam


Vital signs reviewed.





General: Chronically ill-appearing female, in no significant distress. 

Parkinsonism like tremors, somewhat agitated.





HEENT: No scleral icterus, PERRLA, neck supple.  Atraumatic.





Cardiovascular: Regular rate and rhythm, no extra sounds.





Pulmonary: Clear to auscultation bilaterally, normal work of breathing.





Abdomen: Soft, nontender, nondistended, positive bowel sounds.





Back: Markedly kyphotic.  Paraspinous muscle tenderness without specific trauma 

noted.





Musculoskeletal: Atraumatic, no peripheral edema.





Neurologic: Patient awake alert and oriented x 3, full strength in all 4 

extremities.  Cranial nerves 2 through 12 grossly intact.





Skin: Warm, dry, no rash





Medical Decision & Procedures


Laboratory Results


7/19/17 16:27








Red Blood Count 4.71, Mean Corpuscular Volume 91.3, Mean Corpuscular Hemoglobin 

29.5, Mean Corpuscular Hemoglobin Concent 32.3, Mean Platelet Volume 11.3, 

Neutrophils (%) (Auto) 75.4, Lymphocytes (%) (Auto) 14.3, Monocytes (%) (Auto) 

8.1, Eosinophils (%) (Auto) 2.0, Basophils (%) (Auto) 0.1, Neutrophils # (Auto) 

5.51, Lymphocytes # (Auto) 1.05, Monocytes # (Auto) 0.59, Eosinophils # (Auto) 

0.15, Basophils # (Auto) 0.01





7/19/17 16:27

















Test


  7/19/17


16:27


 


White Blood Count


  7.32 K/uL


(4.8-10.8)


 


Red Blood Count


  4.71 M/uL


(4.2-5.4)


 


Hemoglobin


  13.9 g/dL


(12.0-16.0)


 


Hematocrit 43.0 % (37-47) 


 


Mean Corpuscular Volume


  91.3 fL


()


 


Mean Corpuscular Hemoglobin


  29.5 pg


(25-34)


 


Mean Corpuscular Hemoglobin


Concent 32.3 g/dl


(32-36)


 


Platelet Count


  219 K/uL


(130-400)


 


Mean Platelet Volume


  11.3 fL


(7.4-10.4)


 


Neutrophils (%) (Auto) 75.4 % 


 


Lymphocytes (%) (Auto) 14.3 % 


 


Monocytes (%) (Auto) 8.1 % 


 


Eosinophils (%) (Auto) 2.0 % 


 


Basophils (%) (Auto) 0.1 % 


 


Neutrophils # (Auto)


  5.51 K/uL


(1.4-6.5)


 


Lymphocytes # (Auto)


  1.05 K/uL


(1.2-3.4)


 


Monocytes # (Auto)


  0.59 K/uL


(0.11-0.59)


 


Eosinophils # (Auto)


  0.15 K/uL


(0-0.5)


 


Basophils # (Auto)


  0.01 K/uL


(0-0.2)


 


RDW Standard Deviation


  45.0 fL


(36.4-46.3)


 


RDW Coefficient of Variation


  13.5 %


(11.5-14.5)


 


Immature Granulocyte % (Auto) 0.1 % 


 


Immature Granulocyte # (Auto)


  0.01 K/uL


(0.00-0.02)


 


Anion Gap


  6.0 mmol/L


(3-11)


 


Est Creatinine Clear Calc


Drug Dose 68.1 ml/min 


 


 


Estimated GFR (


American) 105.1 


 


 


Estimated GFR (Non-


American 90.7 


 


 


BUN/Creatinine Ratio 17.9 (10-20) 


 


Calcium Level


  9.1 mg/dl


(8.5-10.1)


 


Magnesium Level


  2.2 mg/dl


(1.8-2.4)


 


Total Bilirubin


  1.0 mg/dl


(0.2-1)


 


Direct Bilirubin  mg/dl (0-0.2) 


 


Aspartate Amino Transf


(AST/SGOT) 10 U/L (15-37) 


 


 


Alanine Aminotransferase


(ALT/SGPT) 9 U/L (12-78) 


 


 


Alkaline Phosphatase


  89 U/L


()


 


Total Protein


  6.2 gm/dl


(6.4-8.2)


 


Albumin


  3.7 gm/dl


(3.4-5.0)


 


Chemistry Specimen Hemolysis  








Laboratory results per my review.





Medications Administered











 Medications


  (Trade)  Dose


 Ordered  Sig/Won


 Route  Start Time


 Stop Time Status Last Admin


Dose Admin


 


 Acetaminophen/


 Hydrocodone Bitart


  (Norco 7.5/325


 Tab)  1 tab  NOW  STAT


 PO  7/19/17 16:12


 7/19/17 16:14 DC 7/19/17 16:25


1 TAB











ED Course


1610: Past medical records reviewed. The patient was evaluated in room B11B. A 

complete history and physical examination was performed. 





1612: Ordered Norco 7.5/325 Tab 1 tab PO.





1747: I reevaluated the patient and she is resting comfortably.  I discussed 

the exam findings with her and I discussed the treatment plan.  She verbalized 

complete understanding and agreement.  The patient is ready to go home.





Medical Decision


The patient is a 72 year old female who presents to the ED with complaints of 

back pain.  Differentials include medication noncompliance, muscular spasm, 

radiculopathy, acute on chronic pain, anxiety.





This patient was evaluated and appeared to be in no significant distress.  

Patient was medicated with Norco 7.5/325.  Patient's pain was largely improved.

  He has a follow-up appointment with her pain management physician tomorrow.  

She was given a short prescription for Norco and baclofen.  She will return to 

the ER for worsening of symptoms or any medical concerns.





[place] Drug [place]


Search Results:  patient reviewed within database


Drug Monitoring Findings:


not ID in system





Medication Reconcilliation


Current Medication List:  was personally reviewed by me





Blood Pressure Screening


Patient's blood pressure:  Normal blood pressure





Impression





 Primary Impression:  


 Radiculopathy of cervical spine


 Additional Impression:  


 Muscle spasm





Scribe Attestation


The scribe's documentation has been prepared under my direction and personally 

reviewed by me in its entirety. I confirm that the note above accurately 

reflects all work, treatment, procedures, and medical decision making performed 

by me.





Departure Information


Dispostion


Home / Self-Care





Referrals


Bill Rubin M.D. (PCP)





Forms


HOME CARE DOCUMENTATION FORM,                                                 

               IMPORTANT VISIT INFORMATION





Patient Instructions


My [facility] [place] [employer]





Additional Instructions





Diagnosis: Cervical radiculopathy, muscular spasm





Baclofen 20 mg 3 times a day as needed for muscular spasm.





Norco one tablet every 6 hours as needed for severe pain.





Follow-up with Dr. Garay tomorrow as scheduled





Return to the emergency department for worsening of symptoms or any medical 

concerns.





Problem Qualifiers

## 2017-07-20 ENCOUNTER — HOSPITAL ENCOUNTER (EMERGENCY)
Dept: HOSPITAL 45 - C.EDB | Age: 72
Discharge: HOME | End: 2017-07-20
Payer: COMMERCIAL

## 2017-07-20 VITALS
HEIGHT: 62.01 IN | BODY MASS INDEX: 24.84 KG/M2 | BODY MASS INDEX: 24.84 KG/M2 | WEIGHT: 136.69 LBS | WEIGHT: 136.69 LBS | HEIGHT: 62.01 IN

## 2017-07-20 VITALS — OXYGEN SATURATION: 98 % | HEART RATE: 76 BPM

## 2017-07-20 VITALS — SYSTOLIC BLOOD PRESSURE: 144 MMHG | DIASTOLIC BLOOD PRESSURE: 79 MMHG

## 2017-07-20 VITALS — TEMPERATURE: 98.24 F

## 2017-07-20 DIAGNOSIS — Z79.82: ICD-10-CM

## 2017-07-20 DIAGNOSIS — I21.3: ICD-10-CM

## 2017-07-20 DIAGNOSIS — M54.2: Primary | ICD-10-CM

## 2017-07-20 DIAGNOSIS — G20: ICD-10-CM

## 2017-07-20 DIAGNOSIS — Z82.49: ICD-10-CM

## 2017-07-20 DIAGNOSIS — I50.9: ICD-10-CM

## 2017-07-20 DIAGNOSIS — I72.9: ICD-10-CM

## 2017-07-21 NOTE — EMERGENCY ROOM VISIT NOTE
History


Report prepared by Elaina:  Melina Valladares


Under the Supervision of:  Dr. Jorge Guo M.D.


First contact with patient:  22:16


Chief Complaint:  NECK PAIN


Stated Complaint:  BAD PAIN IN BACK OF NECK





History of Present Illness


The patient is a 72 year old female who presents to the Emergency Room with 

complaints of an episode of neck pain starting three days ago. She states that 

she went to the chiropractor who didn't help and came to the ED yesterday who 

gave her baclofen that makes her shake "like a leaf." The patient states that 

she did not injure her neck. She states that she has seen an orthopedic doctor 

that gave her shots in her neck that made it worse. She states that the pain 

feels like a "snake bite" and radiates into her shoulders. The patient notes a 

history of Parkinson Disease. She denies chest pain, shortness of breath, head 

ache, and fever.





   Source of History:  patient


   Onset:  three days ago


   Position:  neck


   Quality:  other ("snake bite")


   Timing:  other (episode)


   Modifying Factors (Worsening):  other (orthopedic shots)


   Associated Symptoms:  No fevers, No headache, No chest pain, No SOB





Review of Systems


See HPI for pertinent positives & negatives. A total of 10 systems reviewed and 

were otherwise negative.





Past Medical & Surgical


Medical Problems:


(1) CAD S/P percutaneous coronary angioplasty


(2) CHF (congestive heart failure), NYHA class III


(3) Heart disease


(4) Parkinson disease


(5) Pneumonia


(6) Spinal stenosis


(7) STEMI (ST elevation myocardial infarction)


(8) STEMI (ST elevation myocardial infarction)


(9) Ventricular aneurysm








Family History





Cancer


Hypertension


Kidney disease


Kidney stones





Social History


Smoking Status:  Never Smoker


Alcohol Use:  none


Drug Use:  none


Marital Status:  


Housing Status:  lives with significant other


Occupation Status:  retired





Current/Historical Medications


Scheduled


Amantadine HCl (Amantadine HCl), 100 MG PO QAM


Aspirin (Aspirin Ec), 81 MG PO DAILY


Atorvastatin (Lipitor), 80 MG PO HS


Carbidopa/Levodopa (Sinemet 25MG/100MG), 1 TAB PO Q1H


Gabapentin (Gabapentin), 100 MG PO TID


Metoprolol Succinate (Metoprolol Succinate ER), 12.5 MG PO QAM


Pramipexole Dihydrochloride (Pramipexole Dihydrochlori), 1 MG PO TID


Prednisone (Prednisone), 2 TAB PO DAILY


Ranitidine HCl (Ranitidine HCl), 150 MG PO BID





Scheduled PRN


Baclofen (Baclofen), 20 MG PO TID PRN for Pain


Hydrocodone/Acetaminophen 5MG/325MG (Norco 5MG/325MG), 1 TABLET PO Q6H PRN for 

Pain


Lactulose (Chronulac), 60 ML PO BID PRN for Constipation





Allergies


Coded Allergies:  


     No Known Allergies (Unverified , 7/20/17)





Physical Exam


Vital Signs











  Date Time  Temp Pulse Resp B/P (MAP) Pulse Ox O2 Delivery O2 Flow Rate FiO2


 


7/20/17 23:38  76 16  98   


 


7/20/17 22:51   20 144/79 93 Room Air  


 


7/20/17 20:55 36.8 114 18 112/49 97 Room Air  











Physical Exam





Constitutional: Vital signs reviewed.


Eyes: Pupils are equal round reactive to light.  Conjunctiva are noninjected.  


ENT: Pharynx is clear without erythema or exudate.  Mucous membranes are moist.

  Neck supple without meningeal signs. No midline tenderness to cervical spine.


Respiratory: Clear to auscultation bilaterally.  Breath sounds are equal 

bilaterally. 


Cardiovascular: Regular rate and rhythm.  No rubs or gallops.


GI: Soft, nondistended and nontender.  Bowel sounds are present.


Musculoskeletal: No peripheral edema.  No lower extremity tenderness. 


Integumentary: No cyanosis.


Neurological: The patient is awake and alert.  No focal deficits. Normal motor 

and sensation throughout the upper extremities, multiple involuntary movements 

of the arms and legs.


Psychiatric: Normal affect.





Medical Decision & Procedures


Medications Administered











 Medications


  (Trade)  Dose


 Ordered  Sig/[facility]


 Route  Start Time


 Stop Time Status Last Admin


Dose Admin


 


 Dexamethasone


 Sodium Phosphate


  (Decadron Inj)  10 mg  NOW  ONCE


 IM  7/20/17 22:30


 7/20/17 22:31 DC 7/20/17 22:51


10 MG


 


 Fentanyl Citrate


  (Fentanyl Inj)  50 mcg  NOW  STAT


 IM  7/20/17 22:25


 7/20/17 22:26 DC 7/20/17 22:50


50 MCG


 


 Ondansetron HCl


  (Zofran Odt)  4 mg  ONE  ONCE


 PO  7/20/17 22:30


 7/20/17 22:31 DC 7/20/17 22:51


4 MG











ED Course


2218: The patient was evaluated in room B4. A complete history and physical 

exam was performed.





2225: Ordered Fentanyl Inj 50 mcg IM.





2230: Ordered Zofran Odt 4 mg PO, Decadron Inj 10 mg IM.





2319: Upon reevaluation, the patient appeared to have improvement of her 

symptoms. I discussed tonight's findings with the patient . She verbalized 

agreement of the treatment plan. The patient was discharged home.





Medical Decision


This is a 72-year-old female who presents with neck pain.  Differential 

diagnosis includes osteophytes, arthritis, cervical disc disease, radiculopathy

, strain.  I did perform a limited focused review of portions of the patient's 

old chart on the electronic medical record. The patient was seen July 19, 2017 

for back and neck pain. A full work up was completed and was diagnosed with 

radiculopathy of the cervical spine. She had a CT of her cervical spine in May 

which showed advanced degenerative disc disease at C5, C6, and C7 with moderate 

narrowing of C3 to C5.





Medication Reconciliation: I attest that I have personally reviewed the patient'

s current medication list.





Blood Pressure Screening: Patient was found to have normal blood pressure on 

screening and does not require follow-up.





I did evaluate the patient as noted above.  The patient is presenting with 

persistent neck pain.  She had a evaluation here yesterday for the same and was 

treated with baclofen and Norco.  She states the baclofen gives her side 

effects and she cannot take it.  The patient denies any injury to her neck.  

She has been followed by pain management and has had injections into her neck 

without relief.  I did treat the patient with IM fentanyl and Decadron.  On 

reassessment the patient is feeling much better.  She has no significant neck 

pain at this time.  I did recommend she stop the baclofen and can continue the 

Norco as needed.  She was also given a short prescription for prednisone and 

given precautions regarding this medication.  She was advised follow closely 

with her doctor and pain specialist.





Impression





 Primary Impression:  


 Neck pain





Scribe Attestation


The scribe's documentation has been prepared under my direct and personally 

reviewed by me in its entirety. I confirm that the note above accurately 

reflects all work, treatment, procedures, and medical decision making performed 

by me.





Departure Information


Dispostion


Home / Self-Care





Prescriptions





Prednisone (Prednisone) 20 Mg Tab


2 TAB PO DAILY for 5 Days, #10 TAB


   FOR 4 DAYS


   Prov: Jorge Guo M.D.         7/20/17





Referrals


No Doctor, Assigned (PCP)





Forms


HOME CARE DOCUMENTATION FORM,                                                 

               IMPORTANT VISIT INFORMATION, WORK / SCHOOL INSTRUCTIONS





Patient Instructions


My [facility]





Additional Instructions





You have been examined and treated today on an emergency basis only. This is 

not a substitute for, or an effort to provide, complete comprehensive medical 

care. It is impossible to recognize and treat all injuries or illnesses in a 

single emergency department visit. It is therefore important that you follow up 

closely with your physician.  Call as soon as possible for an appointment.  

Return for worsening symptoms or if you develop fever, vomiting, abdominal pain

, loss of control of your bowel or bladder, numbness or weakness to your arms 

or legs, chest pain, shortness of breath, or any other concerning symptoms.

## 2017-12-07 ENCOUNTER — HOSPITAL ENCOUNTER (OUTPATIENT)
Dept: HOSPITAL 45 - C.RAD1850 | Age: 72
Discharge: HOME | End: 2017-12-07
Attending: FAMILY MEDICINE
Payer: COMMERCIAL
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
## 2017-12-07 DIAGNOSIS — S49.92XA: ICD-10-CM

## 2017-12-07 DIAGNOSIS — M19.012: Primary | ICD-10-CM

## 2017-12-07 NOTE — DIAGNOSTIC IMAGING REPORT
L SHOULDER MIN 2 VIEWS ROUTINE



HISTORY:  72 years-old Female LT SHOULDER INJURHY chronic left shoulder pain



COMPARISON: Chest radiographs 1/2/2017



TECHNIQUE: 3 views of the left shoulder



FINDINGS: 

Moderate glenohumeral and acromioclavicular osteoarthritis without acute

fracture or dislocation identified. Left pectoral battery pack is noted with a

stimulator leads projecting superiorly. Soft tissues are unremarkable. Imaged

lung fields are unremarkable. Coronary arterial stent graft noted.



IMPRESSION: 

1. No acute fracture or dislocation.

2. Moderate degenerative changes of the left shoulder. 







The above report was generated using voice recognition software. It may contain

grammatical, syntax or spelling errors.







Electronically signed by:  Jeremiah Mendoza M.D.

12/7/2017 3:13 PM



Dictated Date/Time:  12/7/2017 3:12 PM

## 2018-01-04 ENCOUNTER — HOSPITAL ENCOUNTER (OUTPATIENT)
Dept: HOSPITAL 45 - C.MAMM | Age: 73
Discharge: HOME | End: 2018-01-04
Attending: FAMILY MEDICINE
Payer: COMMERCIAL

## 2018-01-04 DIAGNOSIS — M85.832: Primary | ICD-10-CM

## 2018-01-04 DIAGNOSIS — R29.6: ICD-10-CM

## 2018-04-18 ENCOUNTER — HOSPITAL ENCOUNTER (OUTPATIENT)
Dept: HOSPITAL 45 - C.RAD1850 | Age: 73
Discharge: HOME | End: 2018-04-18
Attending: PHYSICIAN ASSISTANT
Payer: COMMERCIAL

## 2018-04-18 DIAGNOSIS — R07.9: Primary | ICD-10-CM

## 2018-04-18 NOTE — DIAGNOSTIC IMAGING REPORT
AP CHEST SINGLE VIEW



CLINICAL HISTORY: Atypical chest pain    



COMPARISON STUDY:  January 2, 2017



FINDINGS: A generator projects over the left chest wall with electrodes

extending cephalad. The patient's chin obscures the lung apices. The heart is

normal in size. There is no failure. There is no focal pulmonary consolidation.

There are no pleural effusions. The patient was unable to get out of the

wheelchair for lateral view. The patient's arms in the wheelchair did not come

off and therefore lateral view could not be obtained.[ 



IMPRESSION: No active disease in the chest.







Electronically signed by:  Dante Hilliard M.D.

4/18/2018 4:52 PM



Dictated Date/Time:  4/18/2018 4:51 PM